# Patient Record
(demographics unavailable — no encounter records)

---

## 2025-01-29 NOTE — PHYSICAL EXAM
[de-identified] : Neurologic: normal mood and affect, orientated and able to communicate Constitutional: well developed and well nourished  Left Knee: no effusion, erythema, ecchymosis, scars or deformities medial facet of patella tenderness patellofemoral tenderness patellar compression tenderness full flexion and extension without pain (0-140) Ligamental stable

## 2025-01-29 NOTE — HISTORY OF PRESENT ILLNESS
[de-identified] : The patient is a 47 year old RIGHT hand dominant female who presents today complaining of LEFT knee pain. Date of Injury/Onset: 1/21/25 Pain:    At Rest: 0/10  With Activity:  8/10  Mechanism of injury: Patient reports coming back from traveling 1/20/25 when her both her feet became swollen and the next day felt a posterior left knee lump with some swelling.  This is NOT a Work Related Injury being treated under Worker's Compensation. This is NOT an athletic injury occurring associated with an interscholastic or organized sports team. Quality of symptoms: swelling, tight, becomes worse by the end of the day.  Improves with: denies treatment Worse with: walking, worse at the end of the day.  Prior treatment: No. Prior Imaging: No. Out of work/sport: _, since No. School/Sport/Position/Occupation: School counselor, [employer].  Additional Information: History of left knee surgery 20 years ago. She admits wanting to talk about B/L knee pain she has had intermittently over the years. She states the pain becoming worse within the last 3 months.

## 2025-01-29 NOTE — ADDENDUM
[FreeTextEntry1] : Documented by Pete Smith acting as a scribe for Dr. Ennis and Jonah Winn PA-C on 01/29/2025 and was presence for the following sections: Physical Exam; Data Reviewed; Assessment; Discussion/Summary. All medical record entries made by the Scribe were at my, Dr. Ennis, and Jonah Winn, direction and personally dictated by me on 01/29/2025. I have reviewed the chart and agree that the record accurately reflects my personal performance of the history, physical exam, procedure and imaging.

## 2025-01-29 NOTE — DISCUSSION/SUMMARY
[de-identified] : Reviewed all X Ray images with patient today and interpretation was provided. Packet of exercises provided for home strengthening and/or stretching. PFS Discussed treatment options, the patient would like to follow through with left knee CSI injection. Patient will follow up as needed.     RB&A to corticosteroid injection discussed. All questions were answered. Patient wishes to move forward with injection today.     Left Knee Ultrasound Guided aspiration/steroid injection procedure note: Patient Identification Name/: Verbal with patient and/or family Procedure Verification: Procedure confirmed with patient or family/designee Consent for procedure: Verbal Consent Given Relevant documentation completed, reviewed, and signed Clinical indications for procedure confirmed Time-out with all members of procedure team immediately prior to procedure: Correct patient identified. Agreement on procedure. Correct side and site.   KNEE INTRAARTICULAR INJECTION - LEFT After verbal consent and identification of the correct patient and correct site, the LEFT superolateral knee was prepped using alcohol swabs and betadine. This was allowed time to air dry. A mixture of Kenalog, Bupivacaine was injected into the left knee using a sterile 22G 1.5-inch needle. The patient tolerated the procedure well. A sterile dressing was placed. After-care instructions were provided and included instructions to ice the area and to call if redness, pain, or fever develop.     ----------------------------------------------- Home Exercise The patient is instructed on a home exercise program.  PENNY ELLSWORTH Acting as a Scribe for Dr. Raymon MCDONNELL, Penny Ellsworth, attest that this documentation has been prepared under the direction and in the presence of Provider Dr. Ennis.  Activity Modification The patient was advised to modify their activities.  Dx / Natural History The patient was advised of the diagnosis. The natural history of the pathology was explained in full to the patient in layman's terms. Several different treatment options were discussed and explained in full to the patient including the risks and benefits of both surgical and non-surgical treatments.  All questions and concerns were answered.  Pain Guide Activities The patient was advised to let pain guide the gradual advancement of activities.  GERHARD MCDONNELL explained to the patient that rest, ice, compression, and elevation would benefit them. They may return to activity after follow-up or when they no longer have any pain.  The patient's current medication management of their orthopedic diagnosis was reviewed today: (1) We discussed a comprehensive treatment plan that included possible pharmaceutical management involving the use of prescription strength medications including but not limited to options such as oral Naprosyn 500mg BID, once daily Meloxicam 15 mg, or 500-650 mg Tylenol versus over the counter oral medications and topical prescription NSAID Pennsaid vs over the counter Voltaren gel. (2) There is a moderate risk of morbidity with further treatment, especially from use of prescription strength medications and possible side effects of these medications which include upset stomach with oral medications, skin reactions to topical medications and cardiac/renal issues with long term use. (3) I recommended that the patient follow-up with their medical physician to discuss any significant specific potential issues with long term medication use such as interactions with current medications or with exacerbation of underlying medical comorbidities. (4) The benefits and risks associated with use of injectable, oral or topical, prescription and over the counter anti-inflammatory medications were discussed with the patient. The patient voiced understanding of the risks including but not limited to bleeding, stroke, kidney dysfunction, heart disease, and were referred to the black box warning label for further information.

## 2025-01-29 NOTE — DATA REVIEWED
[FreeTextEntry1] : 1/29/25 OC X-RAY Left Knee 4 views: This scan was reviewed and interpreted by Dr. Ennis, and his findings are- patella kishor, otherwise unremarkable

## 2025-02-19 NOTE — REVIEW OF SYSTEMS
[Sneezing] : sneezing [Seasonal Allergies] : seasonal allergies [Nasal Congestion] : nasal congestion [Recurrent Sinus Infections] : recurrent sinus infections [Problem Snoring] : problem snoring [Sense Of Smell Problem] : sense of smell problem [Hoarseness] : hoarseness [Throat Clearing] : throat clearing [Throat Pain] : throat pain [Heartburn] : heartburn [Negative] : Heme/Lymph [Patient Intake Form Reviewed] : Patient intake form was reviewed [FreeTextEntry1] : headache